# Patient Record
Sex: MALE | Race: WHITE | NOT HISPANIC OR LATINO | Employment: UNEMPLOYED | URBAN - NONMETROPOLITAN AREA
[De-identification: names, ages, dates, MRNs, and addresses within clinical notes are randomized per-mention and may not be internally consistent; named-entity substitution may affect disease eponyms.]

---
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
## 2024-10-17 ENCOUNTER — OFFICE VISIT (OUTPATIENT)
Age: 19
End: 2024-10-17

## 2024-10-17 DIAGNOSIS — M79.641 PAIN IN RIGHT HAND: Primary | ICD-10-CM

## 2024-10-17 DIAGNOSIS — S63.501A SPRAIN OF RIGHT WRIST, INITIAL ENCOUNTER: ICD-10-CM

## 2024-10-17 DIAGNOSIS — M25.531 ACUTE PAIN OF RIGHT WRIST: Primary | ICD-10-CM

## 2024-10-17 RX ORDER — METHYLPREDNISOLONE 4 MG
TABLET, DOSE PACK ORAL
Qty: 21 TABLET | Refills: 0 | Status: SHIPPED | OUTPATIENT
Start: 2024-10-17

## 2024-10-17 ASSESSMENT — ENCOUNTER SYMPTOMS: SHORTNESS OF BREATH: 0

## 2024-10-17 NOTE — PROGRESS NOTES
[facility]  [facility]  [address]  [place] [address]  Dept: [phone]  Dept Fax: [phone]  Loc: [phone]     Darrian Matos (:  2005) is a 19 y.o. male,New patient, here for evaluation of the following:    Chief Complaint   Patient presents with    Hand Pain     Right            Subjective   Patient is a 19-year-old  male changes clinic with right hand pain.  He states has been hurting for about a month.  He denies any specific injury to cause the pain.  He states he only has pain with certain movements.  He also has pain when he lifts over 30 pounds.  He is a  by trade.  He does have pain when he uses a sledgehammer.  He is not taking any over-the-counter medications for his pain.  He states his pain is off and on.  He states that he gets pain in an incident but then it goes away quickly.  He has pain when he bears weight through his wrist.    Hand Pain   Pertinent negatives include no chest pain.       No Known Allergies  No past surgical history on file.  Social History     Tobacco Use    Smoking status: Never    Smokeless tobacco: Never          Review of Systems   Constitutional:  Negative for fatigue and fever.   Respiratory:  Negative for shortness of breath.    Cardiovascular:  Negative for chest pain.   Musculoskeletal:  Positive for arthralgias.   Skin:  Negative for rash and wound.   Neurological:  Negative for weakness.   All other systems reviewed and are negative.         Objective   Physical Exam  Constitutional:       General: He is not in acute distress.     Appearance: Normal appearance.   HENT:      Head: Normocephalic.   Pulmonary:      Effort: Pulmonary effort is normal.   Musculoskeletal:      Comments: Upon inspection of the right hand there is no erythema, ecchymosis, deformity.  Patient performs normal range of motion.  Patient with pain with overextended and over flexing of the wrist.  No

## 2024-10-31 ENCOUNTER — OFFICE VISIT (OUTPATIENT)
Age: 19
End: 2024-10-31
Payer: COMMERCIAL

## 2024-10-31 VITALS — WEIGHT: 158 LBS | BODY MASS INDEX: 22.62 KG/M2 | HEIGHT: 70 IN

## 2024-10-31 DIAGNOSIS — M25.531 ACUTE PAIN OF RIGHT WRIST: Primary | ICD-10-CM

## 2024-10-31 DIAGNOSIS — S63.502D SPRAIN OF LEFT WRIST, SUBSEQUENT ENCOUNTER: ICD-10-CM

## 2024-10-31 PROCEDURE — 99213 OFFICE O/P EST LOW 20 MIN: CPT | Performed by: PHYSICIAN ASSISTANT

## 2024-10-31 ASSESSMENT — ENCOUNTER SYMPTOMS: SHORTNESS OF BREATH: 0

## 2024-10-31 NOTE — PROGRESS NOTES
[facility]  [facility]  [address]  [place] [address]  Dept: [phone]  Dept Fax: [phone]  Loc: [phone]     Darrian Matos (:  2005) is a 19 y.o. male,Established patient, here for evaluation of the following:    Chief Complaint   Patient presents with    Hand Pain     R hand           Subjective   Patient is a 19-year-old  male presented to clinic with for follow-up of his right wrist pain.  He states it still causes pain when he uses it.  His last office visit was on  where we dispensed a wrist brace and a Medrol Dosepak.  He reports that wearing the wrist brace makes it more sore in the morning.  He states there is been no change in his symptoms.  He still has pain when using a sledgehammer and lifting anything.    Hand Pain   Pertinent negatives include no chest pain.       No Known Allergies  History reviewed. No pertinent surgical history.  Social History     Tobacco Use    Smoking status: Never    Smokeless tobacco: Never          Review of Systems   Constitutional:  Negative for fatigue and fever.   Respiratory:  Negative for shortness of breath.    Cardiovascular:  Negative for chest pain.   Musculoskeletal:  Positive for arthralgias.   Skin:  Negative for rash and wound.   Neurological:  Negative for weakness.   All other systems reviewed and are negative.         Objective   Physical Exam  Constitutional:       General: He is not in acute distress.     Appearance: Normal appearance.   HENT:      Head: Normocephalic.   Pulmonary:      Effort: Pulmonary effort is normal.   Musculoskeletal:      Comments: Upon inspection of the right wrist there is no erythema, ecchymosis, deformity.  Patient performs normal range of motion of the wrist.  Patient can make a composite fist.  Patient performs normal  strength.  There is a palpable nodule between radius and ulna that is mobile and mildly tender.

## 2024-11-01 ENCOUNTER — TELEPHONE (OUTPATIENT)
Age: 19
End: 2024-11-01

## 2024-11-08 ENCOUNTER — HOSPITAL ENCOUNTER (OUTPATIENT)
Dept: MRI IMAGING | Age: 19
Discharge: HOME | End: 2024-11-08
Payer: COMMERCIAL

## 2024-11-08 DIAGNOSIS — S63.502D SPRAIN OF LEFT WRIST, SUBSEQUENT ENCOUNTER: ICD-10-CM

## 2024-11-08 PROCEDURE — 73221 MRI JOINT UPR EXTREM W/O DYE: CPT

## 2024-11-25 ENCOUNTER — OFFICE VISIT (OUTPATIENT)
Age: 19
End: 2024-11-25
Payer: COMMERCIAL

## 2024-11-25 VITALS — BODY MASS INDEX: 23.05 KG/M2 | HEIGHT: 70 IN | WEIGHT: 161 LBS

## 2024-11-25 DIAGNOSIS — S63.501D SPRAIN OF RIGHT WRIST, SUBSEQUENT ENCOUNTER: Primary | ICD-10-CM

## 2024-11-25 PROCEDURE — 99203 OFFICE O/P NEW LOW 30 MIN: CPT | Performed by: NURSE PRACTITIONER

## 2024-11-25 NOTE — PROGRESS NOTES
[facility]  [facility]  [address]  [facility] [id number] [phone]     Patient: Darrian Matos   YOB: 2005   Date: 11/25/2024     Chief Complaint   Patient presents with    Right Wrist        History of Present Illness  Darrian is a right hand dominant 19 y.o. male who presents today for follow-up of his right wrist pain. He states it still causes pain when he uses it.  He was previously given a wrist brace and a Medrol Dosepak. He reports that wearing the wrist brace makes it more sore in the morning. He states there is been no change in his symptoms. He still has pain when lifting anything.  MRI findings are essentially unremarkable with a minor ganglion cyst at the ulnar aspect of the wrist.      No past medical history on file.   No past surgical history on file.   Social History     Socioeconomic History    Marital status: Single     Spouse name: None    Number of children: None    Years of education: None    Highest education level: None   Tobacco Use    Smoking status: Never    Smokeless tobacco: Never     Social Determinants of Health      Received from [facility]    Family and Community Support    Received from [facility]    Abuse Screen    Received from [facility]    Housing Stability      Social History     Occupational History    Not on file   Tobacco Use    Smoking status: Never    Smokeless tobacco: Never   Substance and Sexual Activity    Alcohol use: Not on file    Drug use: Not on file    Sexual activity: Not on file        Tobacco Use      Smoking status: Never      Smokeless tobacco: Never     Family History   Problem Relation Age of Onset    No Known Problems Mother     No Known Problems Father         Medications  Current Outpatient Medications   Medication Sig Dispense Refill    methylPREDNISolone (MEDROL DOSEPACK) 4 MG tablet Take by mouth as directed on  76